# Patient Record
Sex: FEMALE | Race: WHITE | HISPANIC OR LATINO | URBAN - METROPOLITAN AREA
[De-identification: names, ages, dates, MRNs, and addresses within clinical notes are randomized per-mention and may not be internally consistent; named-entity substitution may affect disease eponyms.]

---

## 2021-02-09 ENCOUNTER — NEW PATIENT (OUTPATIENT)
Dept: URBAN - METROPOLITAN AREA CLINIC 30 | Facility: CLINIC | Age: 51
End: 2021-02-09
Payer: COMMERCIAL

## 2021-02-09 DIAGNOSIS — H43.393 OTHER VITREOUS OPACITIES, BILATERAL: ICD-10-CM

## 2021-02-09 DIAGNOSIS — Z79.84 LONG TERM (CURRENT) USE OF ORAL ANTIDIABETIC DRUGS: ICD-10-CM

## 2021-02-09 PROCEDURE — 92004 COMPRE OPH EXAM NEW PT 1/>: CPT | Performed by: OPTOMETRIST

## 2021-02-09 PROCEDURE — 92134 CPTRZ OPH DX IMG PST SGM RTA: CPT | Performed by: OPTOMETRIST

## 2021-02-09 RX ORDER — LIFITEGRAST 50 MG/ML
5 % SOLUTION/ DROPS OPHTHALMIC
Qty: 180 | Refills: 6 | Status: ACTIVE
Start: 2021-02-09

## 2021-02-09 ASSESSMENT — VISUAL ACUITY
OD: 20/25
OS: 20/30

## 2021-02-09 ASSESSMENT — KERATOMETRY
OS: 36.29
OD: 39.14

## 2021-02-09 ASSESSMENT — INTRAOCULAR PRESSURE
OS: 15
OD: 14

## 2021-05-12 ENCOUNTER — OFFICE VISIT (OUTPATIENT)
Dept: URBAN - METROPOLITAN AREA CLINIC 30 | Facility: CLINIC | Age: 51
End: 2021-05-12
Payer: COMMERCIAL

## 2021-05-12 DIAGNOSIS — H16.223 KERATOCONJUNCTIVITIS SICCA, BILATERAL: Primary | ICD-10-CM

## 2021-05-12 DIAGNOSIS — H02.881 MEIBOMIAN GLAND DYSFUNCTION RIGHT UPPER EYELID: ICD-10-CM

## 2021-05-12 PROCEDURE — 68761 CLOSE TEAR DUCT OPENING: CPT | Performed by: OPTOMETRIST

## 2021-05-12 PROCEDURE — 0330T TEAR FILM IMG UNI/BI W/I&R: CPT | Performed by: OPTOMETRIST

## 2021-05-12 NOTE — IMPRESSION/PLAN
Impression: Keratoconjunctivitis sicca, bilateral
05/2021 [place] 276, 285
05/2021 [place] 7, 6 Plan: Some improvement since last visit. ADDE. + computers Cont AT's qid OU,  Refresh Optiv and Relieva. Recommend O3's. Avoid ceiling fans. Blinking exercises. Cont Xiidra BID OU. RTC 2 months FU.

05/2021 BLL Med BVI placed DEC 5/2021 today. Lipiview/ transillumination results indicate OD %, OS % MG atrophy. Discussed options for treatment such as ILUX x 1 in order to maintain MG function. Pt aware of out of pocket cost $350. ILUX done today in office @ $350.

## 2021-05-12 NOTE — IMPRESSION/PLAN
Impression: Meibomian gland dysfunction right upper eyelid: H02.881. Plan: DEC today, see other notes.

## 2021-07-06 ENCOUNTER — OFFICE VISIT (OUTPATIENT)
Dept: URBAN - METROPOLITAN AREA CLINIC 30 | Facility: CLINIC | Age: 51
End: 2021-07-06
Payer: COMMERCIAL

## 2021-07-06 PROCEDURE — 83861 MICROFLUID ANALY TEARS: CPT | Performed by: OPTOMETRIST

## 2021-07-06 PROCEDURE — 99213 OFFICE O/P EST LOW 20 MIN: CPT | Performed by: OPTOMETRIST

## 2021-07-06 ASSESSMENT — INTRAOCULAR PRESSURE
OD: 16
OS: 15

## 2021-07-06 NOTE — IMPRESSION/PLAN
Impression: Keratoconjunctivitis sicca, bilateral
05/2021 [place] 276, 285
05/2021 [place] 7, 6
7/2021 OSMO 283, 280 Plan: Pt notes improvement in symptoms. Less grittiness and no pain OU. ADDE. + computers. Recommend O3's. Cont WCs. Avoid ceiling fans. Blinking exercises. Cont Xiidra BID OU. Cont AT's qid OU--Refresh Optiv and Relieva.


07/2021 BLL Med BVI remain in place DEC 5/2021 today. Lipiview/ transillumination results indicate OD 20%, OS 20% MG atrophy. Discussed options for treatment such as ILUX x 1 in order to maintain MG function. Pt aware of out of pocket cost $350. ILUX done today in office @ $350.

## 2021-07-28 ENCOUNTER — OFFICE VISIT (OUTPATIENT)
Dept: URBAN - METROPOLITAN AREA CLINIC 37 | Facility: CLINIC | Age: 51
End: 2021-07-28
Payer: COMMERCIAL

## 2021-07-28 DIAGNOSIS — H33.322 ROUND HOLE OF RETINA OF LEFT EYE: ICD-10-CM

## 2021-07-28 DIAGNOSIS — H52.13 MYOPIA, BILATERAL: ICD-10-CM

## 2021-07-28 DIAGNOSIS — E11.9 TYPE 2 DIABETES MELLITUS WITHOUT COMPLICATIONS: Primary | ICD-10-CM

## 2021-07-28 PROCEDURE — 99214 OFFICE O/P EST MOD 30 MIN: CPT | Performed by: OPTOMETRIST

## 2021-07-28 ASSESSMENT — KERATOMETRY
OS: 37.00
OD: 39.75

## 2021-07-28 ASSESSMENT — INTRAOCULAR PRESSURE
OS: 18
OD: 18

## 2021-07-28 ASSESSMENT — VISUAL ACUITY
OS: 20/20
OD: 20/20

## 2021-07-28 NOTE — IMPRESSION/PLAN
Impression: Keratoconjunctivitis sicca, bilateral: B37.861. Plan: Art tears PRN, Omega 3 supplements rec'd, Hydration, less caffeine.  cont w [employer] BID OU

## 2021-07-28 NOTE — IMPRESSION/PLAN
Impression: Myopia, bilateral: H52.13. Plan: natalie Huang 86 E after retina consult.  RTC for Pentacam and talk about monovision vs DVO PRK E

## 2021-07-28 NOTE — IMPRESSION/PLAN
Impression: Type 2 diabetes mellitus without complications: A41.6. Plan: RTC 1 year for [address]. Recommend continued BS monitoring and scheduled visits with PCP/ENDO.

## 2021-08-13 ENCOUNTER — OFFICE VISIT (OUTPATIENT)
Dept: URBAN - METROPOLITAN AREA CLINIC 10 | Facility: CLINIC | Age: 51
End: 2021-08-13
Payer: COMMERCIAL

## 2021-08-13 PROCEDURE — 99204 OFFICE O/P NEW MOD 45 MIN: CPT | Performed by: OPHTHALMOLOGY

## 2021-08-13 PROCEDURE — 92134 CPTRZ OPH DX IMG PST SGM RTA: CPT | Performed by: OPHTHALMOLOGY

## 2021-08-13 ASSESSMENT — INTRAOCULAR PRESSURE
OS: 14
OD: 14

## 2021-08-13 NOTE — IMPRESSION/PLAN
Impression: Round hole of retina of left Plan: LEFT eye hole. Asymptomatic   Detailed specialty exam of retina (incl scleral depressed exam) confirms retinal defects which have LOW risk of progression. Pigmentation, stability evident. No urgent laser, but cautious OBSERVATION. LOW - but not zero - risk of progressive tear(s) / RD.  URGED pt RTC immediately for repeated retinal evaluation if progressive / new symptoms (floaters, photopsias, field loss, etc). Understood. RETINA PRN -- F/u for Refractive surgery. PIGMENTED lattice.   No traction

## 2022-12-27 ENCOUNTER — HOSPITAL ENCOUNTER (OUTPATIENT)
Dept: HOSPITAL 95 - LAB SHORT | Age: 52
Discharge: HOME | End: 2022-12-27
Attending: FAMILY MEDICINE
Payer: COMMERCIAL

## 2022-12-27 DIAGNOSIS — R73.03: ICD-10-CM

## 2022-12-27 DIAGNOSIS — E06.3: Primary | ICD-10-CM

## 2022-12-27 LAB
ALBUMIN SERPL BCP-MCNC: 3.7 G/DL (ref 3.4–5)
ALBUMIN/GLOB SERPL: 0.8 {RATIO} (ref 0.8–1.8)
ALT SERPL W P-5'-P-CCNC: 36 U/L (ref 12–78)
ANION GAP SERPL CALCULATED.4IONS-SCNC: 7 MMOL/L (ref 6–16)
AST SERPL W P-5'-P-CCNC: 20 U/L (ref 12–37)
BILIRUB SERPL-MCNC: 0.7 MG/DL (ref 0.1–1)
BUN SERPL-MCNC: 17 MG/DL (ref 8–24)
CALCIUM SERPL-MCNC: 9.5 MG/DL (ref 8.5–10.1)
CHLORIDE SERPL-SCNC: 106 MMOL/L (ref 98–108)
CHOLEST SERPL-MCNC: 161 MG/DL (ref 50–200)
CHOLEST/HDLC SERPL: 3.6 {RATIO}
CO2 SERPL-SCNC: 27 MMOL/L (ref 21–32)
CREAT SERPL-MCNC: 0.82 MG/DL (ref 0.4–1)
DEPRECATED RDW RBC AUTO: 45.3 FL (ref 35.1–46.3)
ERYTHROCYTE [DISTWIDTH] IN BLOOD BY AUTOMATED COUNT: 14.2 % (ref 11.7–14.2)
GLOBULIN SER CALC-MCNC: 4.6 G/DL (ref 2.2–4)
GLUCOSE SERPL-MCNC: 105 MG/DL (ref 70–99)
HCT VFR BLD AUTO: 41.7 % (ref 33–51)
HDLC SERPL-MCNC: 45 MG/DL (ref 39–?)
HGB BLD-MCNC: 13.4 G/DL (ref 11.5–16)
LDLC SERPL CALC-MCNC: 95 MG/DL (ref 0–110)
LDLC/HDLC SERPL: 2.1 {RATIO}
MCHC RBC AUTO-ENTMCNC: 32.1 G/DL (ref 31.5–36.5)
MCV RBC AUTO: 87 FL (ref 80–100)
NRBC # BLD AUTO: 0 K/MM3 (ref 0–0.02)
NRBC BLD AUTO-RTO: 0 /100 WBC (ref 0–0.2)
PLATELET # BLD AUTO: 333 K/MM3 (ref 150–400)
POTASSIUM SERPL-SCNC: 3.4 MMOL/L (ref 3.5–5.5)
PROT SERPL-MCNC: 8.3 G/DL (ref 6.4–8.2)
SODIUM SERPL-SCNC: 140 MMOL/L (ref 136–145)
TRIGL SERPL-MCNC: 103 MG/DL (ref 30–160)
TSH SERPL DL<=0.005 MIU/L-ACNC: 2.39 UIU/ML (ref 0.36–4.8)
VLDLC SERPL CALC-MCNC: 20 MG/DL (ref 6–32)